# Patient Record
Sex: MALE | Race: WHITE
[De-identification: names, ages, dates, MRNs, and addresses within clinical notes are randomized per-mention and may not be internally consistent; named-entity substitution may affect disease eponyms.]

---

## 2019-01-17 ENCOUNTER — HOSPITAL ENCOUNTER (OUTPATIENT)
Dept: HOSPITAL 53 - M CLY | Age: 69
End: 2019-01-17
Attending: PHYSICIAN ASSISTANT
Payer: MEDICARE

## 2019-01-17 DIAGNOSIS — R06.02: Primary | ICD-10-CM

## 2019-01-21 NOTE — REP
Clinical:  Shortness of breath .

 

Comparison: None .

 

Technique:  PA and lateral.

 

Findings:

The mediastinum and cardiac silhouette are normal.  The lung fields are clear and

without acute consolidation, effusion, or pneumothorax.  The skeletal structures

are intact and normal.

 

Impression:

1.   No acute cardiopulmonary process.  If the patient remains symptomatic

consider chest CT for further evaluation.

## 2019-06-27 ENCOUNTER — HOSPITAL ENCOUNTER (OUTPATIENT)
Dept: HOSPITAL 53 - M CR | Age: 69
LOS: 3 days | End: 2019-06-30
Payer: MEDICARE

## 2019-06-27 DIAGNOSIS — Z95.1: Primary | ICD-10-CM

## 2019-06-27 NOTE — CARECAPL
Assessment Account #s:  Initial Assessment


General


Diagnoses:  CABG


Date of event:  Mar 1, 2019


Physician:  Oliver Thompson


Date Entered Program:  Jun 27, 2019


Risk strat for cardiac event:  Moderate





Exercise


Date:  Jun 27, 2019


Assessment:  Initial Assessment





Exercise Prescription


Plan


to educate about cardiac risk factors and to provide a monitored exercise 

program to build strength and endurance


Modalities initiated:  Treadmill (will add), Nustep (will add), Arm Aerometer 

(will add), Dumbells (will add), Recumbent Bike (will add)


Frequency:  3-4


Duration (Minutes)


30-60 minutes total exercise a day.


6-15 work intervals in minutes.


prn rest intervals in minutes.


Functional Capacity Goal


Sustained Metabolic Equivalent of a task (MET) goal of  for  minutes.


Intensity:  3-Moderate


Progression (METS)


Increase by: .5   METS every: 2-3 sessions


Angina with ex:  No


Target Heart Rate


rest + 35-40


Resistance Training:  Yes


Reps:  6-8


Hypertension:  Yes


Hypertension controlled with:  Diet


Resting


131/62


Meds


see below





Medications


Scheduled


Aspirin (Aspir 81), 81 MG PO DAILY, (Reported)


Lisinopril (Lisinopril), 1 TAB PO DAILY, (Reported)


Metoprolol Tartrate (Lopressor), 1 TAB PO BID, (Reported)





Target Goals


Individual exercise Rx (1)


/90 or 130/80 if DM or CKD (1)


Aerobic active 30+min 5 days per week (1)





Nutrition


Date:  Jun 27, 2019


Assessment:  Initial Assessment





Diabetes


Diabetes:  Yes


Diabetes medication


insulin


Monitor Blood Sugar at home:  Yes





Weight Management


Weight (lbs):  176.4


Height (inches):  64


BMI:  30.2


Special Diet:  low salt, mediteranean diet


Alcohol:  none


Diet Access Tool:  Rate your plate


Score:  58





Intervention


Diet Class:  Yes (will see this program)





Target goal


LDL-C<100 if triglycerides are >200


            Non-HDL-C should be <130 (1)


LDL-C<70 for high risk patients (4)


HbA1c<7% (1)


BMI<25 Waist cir<40in M/<35in F (1)





Education


Date:  Jun 27, 2019


Assessment:  Initial Assessment


Learning Barriers:  ready


Knowledge Test Score:  10


Family Support:  Yes


Tobacco use:  No


Quit:  >6 months (1980)





Intervention


Referral to smoking cessation:  No


Individual education and couns:  No


Tobacco Adjunct:  No


Education class schedule given:  Yes





Target Goals


Complete cessation of tobacco use (1).





Psychosocial


Date:  Jun 27, 2019


Assessment:  Initial Assessment





Psych Test (Initial/Discharge)


Tool Used:  CESD


Score:  4





Intervention


Physician Consult:  No


Physician Referral:  No


Stress Management Class:  Yes


Uses Stress Management Skills:  Yes





Target Goal


Assess presence or absence of depression using a valid screening tool (1).


Maximize coping skills (2).


Positive support system (2).





Patient/Program Goal


Preventative Medication:  Yes Aspirin, Yes Beta blockade, Yes Statin/OTR lipid 

Lowering


Fall Risk Assess:  No





Provider Assessment


Provider Assessment:  Proceed with rehab











Nata Lopez RN                  Jun 27, 2019 14:03

## 2019-07-22 NOTE — CARECAPL
Assessment Account #s:  Re-Assessment I


General


Diagnoses:  CABG


Date of event:  Mar 1, 2019


Physician:  Oliver Thompson


Date Entered Program:  Jun 27, 2019


Risk strat for cardiac event:  Moderate





Exercise


Date:  Jul 22, 2019


Assessment:  Re-Assessment I





Exercise Prescription


Plan


TO EDUCATE AND BUILD ENDURANCE THROUGH MONITORED EXERCISE


Modalities initiated:  Treadmill (METS=3.63/RPE=2), Nustep (METS=5.6/RPE=3), Arm

Aerometer (METS=3.6/RPE=3), Dumbells (5#/RPE=2), Recumbent Bike (METS=2.9/RPE=3)


Frequency:  3


Duration (Minutes)


30-60 minutes total exercise a day.


10-12 work intervals in minutes.


5 rest intervals in minutes.


Functional Capacity Goal


Sustained Metabolic Equivalent of a task (MET) goal of 3.75-4.75 for 15-20 

minutes.


Intensity:  3-Moderate


Progression (METS)


Increase by: 0.5   METS every: 5 sessions


Angina with ex:  No


Target Heart Rate


+35-40


Resistance Training:  Yes


Weight (pounds):  5


Reps:  12-15


Hypertension:  Yes


Hypertension controlled with:  Medication


Resting


98/60


Peak Exercise BP


148/80





Medications


Scheduled


Apixaban (Eliquis), 5 MG PO BID, (Reported)


Aspirin (Aspir 81), 81 MG PO DAILY, (Reported)


Atorvastatin Calcium (Atorvastatin Calcium), 10 MG PO DAILY, (Reported)


Cyanocobalamin (Vitamin B-12) (B-12), 1,000 MCG PO DAILY, (Reported)


Ergocalciferol (Vitamin D2) (Vitamin D2), 1 TAB PO DAILY, (Reported)


Fluticasone Propionate (Flonase Allergy Relief), 1 SPRAY NARES DAILY, (Reported)


Lisinopril (Lisinopril), 2.5 TAB PO DAILY, (Reported)


Metformin HCl (Metformin HCl), 1 GRAM PO BID, (Reported)


Metoprolol Tartrate (Lopressor), 25 TAB PO BID, (Reported)


Turmeric Root Extract (Turmeric), 1,053 MG PO DAILY, (Reported)





Scheduled PRN


Cyclobenzaprine HCl (Cyclobenzaprine HCl), 5 MG PO TIDP PRN for muscle spasms, 

(Reported)





Miscellaneous Medications


Insulin Glargine,Hum.rec.anlog (Basaglar Kwikpen U-100), 100 UNIT SC, (Reported)


Current BP


104/88


Med Change:  No





Intervention


Resistance Training:  Yes


Education:  Self pulse, Ex safety, S/S to report (INSTRUCTED TO NOTIFY STAFF OF 

CHEST PAIN/SOB), Low NA diet, BP medication, RPE Scale, Equipment orientation 

(ORIENTED TO EACH PIECE OF EQUIPMENT AS USED), warm up/cool down (EDUCATED ON 

IMPORTANCE OF WARM UPS PRIOR TO EXERCISE AND COOL DOWNS AFTER EXERCISE), 

Understand BP, Physical Active (INSTRUCTED ON IMPORTANCE OF CONTINUED EXERCISE 

AFTER FINISHING CARDIAC REHAB PROGRAM)





Target Goals


Individual exercise Rx (1)


/90 or 130/80 if DM or CKD (1)


Aerobic active 30+min 5 days per week (1)





Nutrition


Date:  Jul 22, 2019


Assessment:  Re-Assessment I


Med Change:  No





Diabetes


Diabetes:  No


Monitor Blood Sugar at home:  No


Medication Change:  No





Weight Management


Weight (lbs):  176


Special Diet:  low salt, mediteranean diet


Alcohol:  none


Current Weight (pounds):  176





Intervention


Dietician Consult:  Yes (WILL SEE DIETICIAN WHILE IN PROGRAM)


Nurse/patient discussion:  Yes


Dietary Goals


MAKE HEART HEALTHY CHOICES


Diet Class:  Yes


Referral to Diabetes education:  No


Referral to lipid clinic:  No


Referral to weight mangement p:  No





Education


Eating Healthy





Target goal


LDL-C<100 if triglycerides are >200


            Non-HDL-C should be <130 (1)


LDL-C<70 for high risk patients (4)


HbA1c<7% (1)


BMI<25 Waist cir<40in M/<35in F (1)





Education


Date:  Jul 22, 2019


Assessment:  Re-Assessment I


Learning Barriers:  ready


Family Support:  Yes


Tobacco use:  No





Tobacco Use


Smokeless tobacco:  No





Intervention


Referral to smoking cessation:  No


Individual education and couns:  No


Tobacco Adjunct:  No


Education class schedule given:  No


Attended education classes:  No


Education:  CAD, Risk factors, med compliance (EDUCATED ON IMPORTANCE OF TAKING 

MEDS AS PRESCRIBED), cardiac A&P, Angina S/S, Sexuality





Target Goals


Complete cessation of tobacco use (1).





Psychosocial


Date:  Jul 22, 2019


Assessment:  Re-Assessment I





Intervention


Physician Consult:  No


Physician Referral:  No


Med Change:  No


Stress Management Class:  No


Uses Stress Management Skills:  Yes





Education


Education:  Coping Techniques, S/S depression, Relaxation Techniques





Target Goal


Assess presence or absence of depression using a valid screening tool (1).


Maximize coping skills (2).


Positive support system (2).





Patient/Program Goal


Preventative Medication:  Yes Aspirin, Yes Beta blockade


Fall Risk Assess:  Yes (NOT A FALL RISK)





Provider Assessment


Session Number:  9


Provider Assessment:  Proceed with rehab











Grupo Akins RN          Jul 22, 2019 16:32

## 2019-07-26 ENCOUNTER — HOSPITAL ENCOUNTER (OUTPATIENT)
Dept: HOSPITAL 53 - M CR | Age: 69
LOS: 5 days | End: 2019-07-31
Payer: MEDICARE

## 2019-07-26 DIAGNOSIS — Z95.1: Primary | ICD-10-CM

## 2019-08-14 NOTE — CARECAPL
Assessment Account #s:  Re-Assessment II


General


Diagnoses:  CABG


Date of event:  Mar 1, 2019


Physician:  Oliver Thompson


Date Entered Program:  Jun 27, 2019


Risk strat for cardiac event:  Moderate





Exercise


Date:  Aug 14, 2019


Assessment:  Re-Assessment II





Exercise Prescription


Modalities initiated:  Treadmill (2.0/.5 mts 2.81 rpe 2   12 minutes), Nustep 

(L7 mts 5.7 rpe 3   15 minutes), Arm Aerometer (3.0 mts 3.8 rpe 3 10 minutes), 

Dumbells, Recumbent Bike (R5 mts 4.3 rpe 3 10 minutes)


Duration (Minutes)


 minutes total exercise a day.


 work intervals in minutes.


 rest intervals in minutes.


Functional Capacity Goal


Sustained Metabolic Equivalent of a task (MET) goal of  for  minutes.


Progression (METS)


Increase by:    METS every:  sessions


Resistance Training:  Yes


Weight (pounds):  6


Reps:  8-12





Medications


Scheduled


Apixaban (Eliquis), 5 MG PO BID, (Reported)


Aspirin (Aspir 81), 81 MG PO DAILY, (Reported)


Atorvastatin Calcium (Atorvastatin Calcium), 10 MG PO DAILY, (Reported)


Cyanocobalamin (Vitamin B-12) (B-12), 1,000 MCG PO DAILY, (Reported)


Ergocalciferol (Vitamin D2) (Vitamin D2), 1 TAB PO DAILY, (Reported)


Fluticasone Propionate (Flonase Allergy Relief), 1 SPRAY NARES DAILY, (Reported)


Liraglutide (Victoza 2-Karsten), 1.2 MG SC DAILY, (Reported)


Lisinopril (Lisinopril), 2.5 TAB PO DAILY, (Reported)


Metformin HCl (Metformin HCl), 1 GRAM PO BID, (Reported)


Metoprolol Tartrate (Lopressor), 25 TAB PO BID, (Reported)


Turmeric Root Extract (Turmeric), 1,053 MG PO DAILY, (Reported)





Scheduled PRN


Cyclobenzaprine HCl (Cyclobenzaprine HCl), 5 MG PO TIDP PRN for muscle spasms, 

(Reported)





Miscellaneous Medications


Insulin Glargine,Hum.rec.anlog (Basaglar Kwikpen U-100), 100 UNIT SC, (Reported)


Current BP


120/80


Med Change:  No





Intervention


Home exercise:  Type (home exercise program on last ITP)


Education Goals Met:  Yes (all education covered on last ITP)





Target Goals


Individual exercise Rx (1)


/90 or 130/80 if DM or CKD (1)


Aerobic active 30+min 5 days per week (1)





Nutrition


Date:  Aug 14, 2019


Assessment:  Re-Assessment II


Med Change:  No


Current Weight (pounds):  178





Intervention


Diet Class:  Yes (dietician see on 7/1/2019)


Education Goals Met:  Yes (all education covered on last ITP)





Target goal


LDL-C<100 if triglycerides are >200


            Non-HDL-C should be <130 (1)


LDL-C<70 for high risk patients (4)


HbA1c<7% (1)


BMI<25 Waist cir<40in M/<35in F (1)





Education


Date:  Aug 14, 2019


Assessment:  Re-Assessment II





Intervention


Attended education classes:  Yes


Education Goals Met:  Yes (all education covered on last ITP)





Target Goals


Complete cessation of tobacco use (1).





Psychosocial


Date:  Aug 14, 2019


Assessment:  Re-Assessment II


Med Change:  No


Education Goals Met:  Yes (all education covered on last ITP)





Target Goal


Assess presence or absence of depression using a valid screening tool (1).


Maximize coping skills (2).


Positive support system (2).





Provider Assessment


Session Number:  14


Provider Assessment:  No changes (good attendance.  Works hard, has excellent 

attitude)











Nata Lopez RN                  Aug 14, 2019 10:27

## 2019-08-30 ENCOUNTER — HOSPITAL ENCOUNTER (OUTPATIENT)
Dept: HOSPITAL 53 - M CR | Age: 69
LOS: 1 days | End: 2019-08-31
Attending: INTERNAL MEDICINE
Payer: MEDICARE

## 2019-08-30 DIAGNOSIS — Z95.1: Primary | ICD-10-CM

## 2019-09-04 NOTE — CARECAPL
Assessment Account #s:  Re-Assessment II (III)


General


Diagnoses:  CABG


Date of event:  Mar 1, 2019


Physician:  Oliver Thompson


Date Entered Program:  Jun 27, 2019


Risk strat for cardiac event:  Moderate





Exercise


Date:  Sep 4, 2019


Assessment:  Re-Assessment II (III)


Stages of change:  Pre-contemplation





Exercise Prescription


Modalities initiated:  Treadmill (2.0/5.0 mts 3.91 rpe 3), Nustep (L7  mts 3.8 

rpe 3 15 minutes), Arm Aerometer (3.5   mts 4.2 rpe 3 10 minutes), Dumbells, 

Recumbent Bike (R5 mts 4.9 rpe 3 10 minutes)


Duration (Minutes)


30 - 60 minutes total exercise a day.


15 - 20 work intervals in minutes.


PRN rest intervals in minutes.


Functional Capacity Goal


Sustained Metabolic Equivalent of a task (MET) goal of 5.0-6.0 for 15-20 

minutes.


Intensity:  3-Moderate


Progression (METS)


Increase by:    METS every:  sessions


Angina with ex:  No


Resistance Training:  Yes


Weight (pounds):  8


Reps:  8-12





Medications


Scheduled


Apixaban (Eliquis), 5 MG PO BID, (Reported)


Aspirin (Aspir 81), 81 MG PO DAILY, (Reported)


Atorvastatin Calcium (Atorvastatin Calcium), 10 MG PO DAILY, (Reported)


Cyanocobalamin (Vitamin B-12) (B-12), 1,000 MCG PO DAILY, (Reported)


Ergocalciferol (Vitamin D2) (Vitamin D2), 1 TAB PO DAILY, (Reported)


Fluticasone Propionate (Flonase Allergy Relief), 1 SPRAY NARES DAILY, (Reported)


Liraglutide (Victoza 2-Karsten), 1.2 MG SC DAILY, (Reported)


Lisinopril (Lisinopril), 2.5 TAB PO DAILY, (Reported)


Metformin HCl (Metformin HCl), 1 GRAM PO BID, (Reported)


Metoprolol Tartrate (Lopressor), 25 TAB PO BID, (Reported)


Turmeric Root Extract (Turmeric), 1,053 MG PO DAILY, (Reported)





Scheduled PRN


Cyclobenzaprine HCl (Cyclobenzaprine HCl), 5 MG PO TIDP PRN for muscle spasms, 

(Reported)





Miscellaneous Medications


Insulin Glargine,Hum.rec.anlog (Basaglar Kwikpen U-100), 100 UNIT SC, (Reported)


Current BP


118/78


Med Change:  No





Intervention


Home exercise:  Type (see prior ITP)


Education Goals Met:  Yes (all education done on prior ITP)





Target Goals


Individual exercise Rx (1)


/90 or 130/80 if DM or CKD (1)


Aerobic active 30+min 5 days per week (1)





Nutrition


Date:  Sep 4, 2019


Assessment:  Re-Assessment II (III)





Diabetes


Diabetes:  Yes


Medication Change:  No


Random Blood Sugar:  104


Blood sugar in range:  Yes


Current Weight (pounds):  178


Weight Goal


160





Intervention


Diet Class:  Yes (met with dietician on 7/1/2019)


Referral to Diabetes education:  No


Referral to lipid clinic:  No


Referral to weight mangement p:  No


Education Goals Met:  No (progressing toward goals.)





Target goal


LDL-C<100 if triglycerides are >200


            Non-HDL-C should be <130 (1)


LDL-C<70 for high risk patients (4)


HbA1c<7% (1)


BMI<25 Waist cir<40in M/<35in F (1)





Education


Date:  Sep 4, 2019


Assessment:  Re-Assessment II (III)


Stages of change:  Pre-contemplation


Education Goals Met:  No (progressing toward goals   all education covered on 

previous ITP)





Target Goals


Complete cessation of tobacco use (1).





Psychosocial


Date:  Sep 4, 2019


Assessment:  Re-Assessment II (III)


Stages of change:  Pre-contemplation


Stress Management Class:  Yes


Uses Stress Management Skills:  Yes


Education Goals Met:  No (all education covered on prior ITP)





Target Goal


Assess presence or absence of depression using a valid screening tool (1).


Maximize coping skills (2).


Positive support system (2).





Provider Assessment


Session Number:  19


Provider Assessment:  No changes (excellent attendance, work ethic and very 

receptive to continued education)











Nata Lopez RN                   Sep 4, 2019 08:54

## 2019-09-06 ENCOUNTER — HOSPITAL ENCOUNTER (OUTPATIENT)
Dept: HOSPITAL 53 - M CR | Age: 69
LOS: 24 days | End: 2019-09-30
Attending: INTERNAL MEDICINE
Payer: MEDICARE

## 2019-09-06 DIAGNOSIS — Z95.1: Primary | ICD-10-CM

## 2019-09-18 NOTE — CARECAPL
Assessment Account #s:  Discharge


General


Diagnoses:  CABG


Date of event:  Mar 1, 2019


Physician:  Oliver Thompson


Date Entered Program:  Jun 27, 2019


Risk strat for cardiac event:  Moderate





Exercise


Assessment:  Followup/Discharge


Stages of change:  maintenance





Exercise Prescription


Modalities initiated:  Treadmill (METS=3.36/RPE=3), Nustep (METS=4.6/RPE=3), Arm

Aerometer (METS=4.1/RPE=3), Dumbells (8#/RPE=3), Recumbent Bike (METS=4.9/RPE=3)


Frequency:  3


Duration (Minutes)


30 - 60 minutes total exercise a day.


15 - 20 work intervals in minutes.


PRN rest intervals in minutes.


Functional Capacity Goal


Sustained Metabolic Equivalent of a task (MET) goal of 5.0-6.0 for 15-20  

minutes.


Intensity:  3-Moderate


Progression (METS)


Increase by:    METS every:  sessions


Angina with ex:  No


Resistance Training:  Yes


Weight (pounds):  8


Reps:  12-15


Hypertension:  Yes


Hypertension controlled with:  Medication (METOPROLOL/LISINOPRIL)


Resting


130/80


Peak Exercise BP


148/84





Medications


Scheduled


Apixaban (Eliquis), 5 MG PO BID, (Reported)


Aspirin (Aspir 81), 81 MG PO DAILY, (Reported)


Atorvastatin Calcium (Atorvastatin Calcium), 10 MG PO DAILY, (Reported)


Cyanocobalamin (Vitamin B-12) (B-12), 1,000 MCG PO DAILY, (Reported)


Ergocalciferol (Vitamin D2) (Vitamin D2), 1 TAB PO DAILY, (Reported)


Fluticasone Propionate (Flonase Allergy Relief), 1 SPRAY NARES DAILY, (Reported)


Liraglutide (Victoza 2-Karsten), 1.2 MG SC DAILY, (Reported)


Lisinopril (Lisinopril), 2.5 TAB PO DAILY, (Reported)


Metformin HCl (Metformin HCl), 1 GRAM PO BID, (Reported)


Metoprolol Tartrate (Lopressor), 25 TAB PO BID, (Reported)


Turmeric Root Extract (Turmeric), 1,053 MG PO DAILY, (Reported)





Scheduled PRN


Cyclobenzaprine HCl (Cyclobenzaprine HCl), 5 MG PO TIDP PRN for muscle spasms, 

(Reported)





Miscellaneous Medications


Insulin Glargine,Hum.rec.anlog (Basaglar Kwikpen U-100), 100 UNIT SC, (Reported)


Current BP


130/80


Med Change:  No





Intervention


Resistance Training:  Yes


Education:  Self pulse (EDUCATION DONE ON PRIOR ITP)


Education Goals Met:  Yes





Target Goals


Individual exercise Rx (1)


/90 or 130/80 if DM or CKD (1)


Aerobic active 30+min 5 days per week (1)





Nutrition


Date:  Sep 18, 2019


Assessment:  Followup/Discharge


Stages of change:  maintenance


Lipid- med/supplement


ATORVASTATIN 10 MG DAILY


Med Change:  No





Diabetes


Diabetes:  Yes


Diabetes medication


METFORMIN,


Monitor Blood Sugar at home:  Yes


Medication Change:  No


Blood sugar in range:  Yes





Weight Management


Weight (lbs):  178


Weight goal:  160


Special Diet:  low salt, low-fat


Current Weight (pounds):  178


Weight Goal


160





Intervention


Dietician Consult:  No


Nurse/patient discussion:  Yes


Dietary Goals


MAKE HEART HEALTHY CHOICES


Diet Class:  Yes (MET WITH DIETICIAN 7/1/2019)


Referral to Diabetes education:  No


Referral to lipid clinic:  No


Referral to weight mangement p:  No





Education


S&S hypo/hyper glycemia, Relate Diabetes in CAD, Eating Healthy


Education Goals Met:  Yes





Target goal


LDL-C<100 if triglycerides are >200


            Non-HDL-C should be <130 (1)


LDL-C<70 for high risk patients (4)


HbA1c<7% (1)


BMI<25 Waist cir<40in M/<35in F (1)





Education


Date:  Sep 18, 2019


Assessment:  Followup/Discharge


Learning Barriers:  ready


Stages of change:  maintenance


Family Support:  Yes


Tobacco use:  No





Tobacco Use


Smokeless tobacco:  No





Intervention


Referral to smoking cessation:  No


Individual education and couns:  No


Tobacco Adjunct:  No


Education class schedule given:  No


Attended education classes:  No


Education:  CAD, Risk factors, med compliance, cardiac A&P, Angina S/S, 

Sexuality


Education Goals Met:  Yes





Target Goals


Complete cessation of tobacco use (1).





Psychosocial


Date:  Sep 18, 2019


Assessment:  Followup/Discharge


Stages of change:  maintenance





Intervention


Physician Consult:  No


Physician Referral:  No


Med Change:  No


Stress Management Class:  No


Uses Stress Management Skills:  Yes





Education


Education:  Coping Techniques (ALL EDUCATION COVERED ON PRIOR ITP), S/S 

depression, Relaxation Techniques


Education Goals Met:  Yes





Target Goal


Assess presence or absence of depression using a valid screening tool (1).


Maximize coping skills (2).


Positive support system (2).


Fall Risk Assess:  Yes (NOT )





Provider Assessment


Session Number:  21


Provider Assessment:  Proceed with rehab











Grupo Akins RN          Sep 18, 2019 16:55

## 2023-05-16 ENCOUNTER — HOSPITAL ENCOUNTER (INPATIENT)
Dept: HOSPITAL 53 - M ED | Age: 73
LOS: 4 days | Discharge: HOME HEALTH | DRG: 603 | End: 2023-05-20
Attending: INTERNAL MEDICINE | Admitting: INTERNAL MEDICINE
Payer: MEDICARE

## 2023-05-16 VITALS — WEIGHT: 178.38 LBS | HEIGHT: 64 IN | BODY MASS INDEX: 30.45 KG/M2

## 2023-05-16 VITALS — SYSTOLIC BLOOD PRESSURE: 133 MMHG | DIASTOLIC BLOOD PRESSURE: 73 MMHG

## 2023-05-16 DIAGNOSIS — E53.8: ICD-10-CM

## 2023-05-16 DIAGNOSIS — E55.9: ICD-10-CM

## 2023-05-16 DIAGNOSIS — Z79.899: ICD-10-CM

## 2023-05-16 DIAGNOSIS — M54.9: ICD-10-CM

## 2023-05-16 DIAGNOSIS — Z95.1: ICD-10-CM

## 2023-05-16 DIAGNOSIS — E78.5: ICD-10-CM

## 2023-05-16 DIAGNOSIS — E11.9: ICD-10-CM

## 2023-05-16 DIAGNOSIS — Z79.82: ICD-10-CM

## 2023-05-16 DIAGNOSIS — G89.29: ICD-10-CM

## 2023-05-16 DIAGNOSIS — Z79.84: ICD-10-CM

## 2023-05-16 DIAGNOSIS — Z87.891: ICD-10-CM

## 2023-05-16 DIAGNOSIS — I25.10: ICD-10-CM

## 2023-05-16 DIAGNOSIS — L03.114: Primary | ICD-10-CM

## 2023-05-16 LAB
ALBUMIN SERPL BCG-MCNC: 3.7 G/DL (ref 3.2–5.2)
ALP SERPL-CCNC: 96 U/L (ref 46–116)
ALT SERPL W P-5'-P-CCNC: < 9 U/L (ref 7–40)
APTT BLD: 29.5 SECONDS (ref 24.8–34.2)
AST SERPL-CCNC: 15 U/L (ref ?–34)
BASOPHILS # BLD AUTO: 0 10^3/UL (ref 0–0.2)
BASOPHILS NFR BLD AUTO: 0.3 % (ref 0–1)
BILIRUB CONJ SERPL-MCNC: 0.3 MG/DL (ref ?–0.4)
BILIRUB SERPL-MCNC: 1.1 MG/DL (ref 0.3–1.2)
BUN SERPL-MCNC: 23 MG/DL (ref 9–23)
CALCIUM SERPL-MCNC: 9.4 MG/DL (ref 8.3–10.6)
CHLORIDE SERPL-SCNC: 100 MMOL/L (ref 98–107)
CO2 SERPL-SCNC: 26 MMOL/L (ref 20–31)
CREAT SERPL-MCNC: 0.81 MG/DL (ref 0.7–1.3)
CRP SERPL-MCNC: 11 MG/DL (ref ?–1)
EOSINOPHIL # BLD AUTO: 0 10^3/UL (ref 0–0.5)
EOSINOPHIL NFR BLD AUTO: 0.2 % (ref 0–3)
ERYTHROCYTE [SEDIMENTATION RATE] IN BLOOD BY WESTERGREN METHOD: 89 MM/HR (ref 0–20)
EST. AVERAGE GLUCOSE BLD GHB EST-MCNC: 146 MG/DL (ref 60–110)
GFR SERPL CREATININE-BSD FRML MDRD: > 60 ML/MIN/{1.73_M2} (ref 42–?)
GLUCOSE SERPL-MCNC: 135 MG/DL (ref 74–106)
HCT VFR BLD AUTO: 42.3 % (ref 42–52)
HGB BLD-MCNC: 13.6 G/DL (ref 13.5–17.5)
INR PPP: 1.05
LYMPHOCYTES # BLD AUTO: 1.5 10^3/UL (ref 1.5–5)
LYMPHOCYTES NFR BLD AUTO: 12.9 % (ref 24–44)
MCH RBC QN AUTO: 28.3 PG (ref 27–33)
MCHC RBC AUTO-ENTMCNC: 32.2 G/DL (ref 32–36.5)
MCV RBC AUTO: 88.1 FL (ref 80–96)
MONOCYTES # BLD AUTO: 1.4 10^3/UL (ref 0–0.8)
MONOCYTES NFR BLD AUTO: 12 % (ref 2–8)
NEUTROPHILS # BLD AUTO: 8.8 10^3/UL (ref 1.5–8.5)
NEUTROPHILS NFR BLD AUTO: 74.4 % (ref 36–66)
PLATELET # BLD AUTO: 201 10^3/UL (ref 150–450)
POTASSIUM SERPL-SCNC: 3.9 MMOL/L (ref 3.5–5.1)
PROT SERPL-MCNC: 7.8 G/DL (ref 5.7–8.2)
PROTHROMBIN TIME: 13.9 SECONDS (ref 12.5–14.5)
RBC # BLD AUTO: 4.8 10^6/UL (ref 4.3–6.1)
RHEUMATOID FACT SERPL-ACNC: 9.5 IU/ML (ref ?–14)
SODIUM SERPL-SCNC: 136 MMOL/L (ref 136–145)
URATE SERPL-MCNC: 4.3 MG/DL (ref 3.7–9.2)
WBC # BLD AUTO: 11.8 10^3/UL (ref 4–10)

## 2023-05-16 RX ADMIN — INSULIN LISPRO SCH UNITS: 100 INJECTION, SOLUTION INTRAVENOUS; SUBCUTANEOUS at 17:30

## 2023-05-16 RX ADMIN — INSULIN LISPRO SCH UNITS: 100 INJECTION, SOLUTION INTRAVENOUS; SUBCUTANEOUS at 20:43

## 2023-05-16 RX ADMIN — PROBIOTIC PRODUCT - TAB SCH EA: TAB at 20:45

## 2023-05-17 VITALS — SYSTOLIC BLOOD PRESSURE: 118 MMHG | DIASTOLIC BLOOD PRESSURE: 68 MMHG

## 2023-05-17 VITALS — SYSTOLIC BLOOD PRESSURE: 125 MMHG | DIASTOLIC BLOOD PRESSURE: 76 MMHG

## 2023-05-17 VITALS — DIASTOLIC BLOOD PRESSURE: 66 MMHG | SYSTOLIC BLOOD PRESSURE: 118 MMHG

## 2023-05-17 LAB
ALBUMIN SERPL BCG-MCNC: 3.3 G/DL (ref 3.2–5.2)
ALP SERPL-CCNC: 86 U/L (ref 46–116)
ALT SERPL W P-5'-P-CCNC: < 9 U/L (ref 7–40)
AST SERPL-CCNC: 12 U/L (ref ?–34)
BILIRUB SERPL-MCNC: 1 MG/DL (ref 0.3–1.2)
BUN SERPL-MCNC: 21 MG/DL (ref 9–23)
CALCIUM SERPL-MCNC: 8.8 MG/DL (ref 8.3–10.6)
CHLORIDE SERPL-SCNC: 100 MMOL/L (ref 98–107)
CO2 SERPL-SCNC: 24 MMOL/L (ref 20–31)
CREAT SERPL-MCNC: 0.74 MG/DL (ref 0.7–1.3)
CRP SERPL-MCNC: 10.4 MG/DL (ref ?–1)
ERYTHROCYTE [SEDIMENTATION RATE] IN BLOOD BY WESTERGREN METHOD: 126 MM/HR (ref 0–20)
GFR SERPL CREATININE-BSD FRML MDRD: > 60 ML/MIN/{1.73_M2} (ref 42–?)
GLUCOSE SERPL-MCNC: 148 MG/DL (ref 74–106)
HCT VFR BLD AUTO: 42 % (ref 42–52)
HGB BLD-MCNC: 13.1 G/DL (ref 13.5–17.5)
MCH RBC QN AUTO: 27.4 PG (ref 27–33)
MCHC RBC AUTO-ENTMCNC: 31.2 G/DL (ref 32–36.5)
MCV RBC AUTO: 87.9 FL (ref 80–96)
PLATELET # BLD AUTO: 181 10^3/UL (ref 150–450)
POTASSIUM SERPL-SCNC: 4.1 MMOL/L (ref 3.5–5.1)
PROT SERPL-MCNC: 7 G/DL (ref 5.7–8.2)
RBC # BLD AUTO: 4.78 10^6/UL (ref 4.3–6.1)
SODIUM SERPL-SCNC: 135 MMOL/L (ref 136–145)
WBC # BLD AUTO: 12 10^3/UL (ref 4–10)

## 2023-05-17 RX ADMIN — ACETAMINOPHEN PRN MG: 325 TABLET ORAL at 21:02

## 2023-05-17 RX ADMIN — PROBIOTIC PRODUCT - TAB SCH EA: TAB at 09:53

## 2023-05-17 RX ADMIN — LISINOPRIL SCH MG: 2.5 TABLET ORAL at 09:52

## 2023-05-17 RX ADMIN — PROBIOTIC PRODUCT - TAB SCH EA: TAB at 17:36

## 2023-05-17 RX ADMIN — DEXTROSE MONOHYDRATE SCH MLS/HR: 5 INJECTION INTRAVENOUS at 18:52

## 2023-05-17 RX ADMIN — METOPROLOL SUCCINATE SCH MG: 25 TABLET, EXTENDED RELEASE ORAL at 09:40

## 2023-05-17 RX ADMIN — INSULIN LISPRO SCH UNITS: 100 INJECTION, SOLUTION INTRAVENOUS; SUBCUTANEOUS at 12:39

## 2023-05-17 RX ADMIN — INSULIN LISPRO SCH UNITS: 100 INJECTION, SOLUTION INTRAVENOUS; SUBCUTANEOUS at 17:52

## 2023-05-17 RX ADMIN — DEXTROSE MONOHYDRATE SCH MLS/HR: 5 INJECTION INTRAVENOUS at 01:24

## 2023-05-17 RX ADMIN — ROSUVASTATIN CALCIUM SCH MG: 10 TABLET, FILM COATED ORAL at 09:53

## 2023-05-17 RX ADMIN — INSULIN LISPRO SCH UNITS: 100 INJECTION, SOLUTION INTRAVENOUS; SUBCUTANEOUS at 09:35

## 2023-05-17 RX ADMIN — INSULIN LISPRO SCH UNITS: 100 INJECTION, SOLUTION INTRAVENOUS; SUBCUTANEOUS at 20:53

## 2023-05-17 RX ADMIN — DEXTROSE MONOHYDRATE SCH MLS/HR: 5 INJECTION INTRAVENOUS at 12:39

## 2023-05-17 RX ADMIN — DEXTROSE MONOHYDRATE SCH MLS/HR: 50 INJECTION, SOLUTION INTRAVENOUS at 16:12

## 2023-05-17 RX ADMIN — ASPIRIN 81 MG CHEWABLE TABLET SCH MG: 81 TABLET CHEWABLE at 09:53

## 2023-05-17 RX ADMIN — DEXTROSE MONOHYDRATE SCH MLS/HR: 5 INJECTION INTRAVENOUS at 06:16

## 2023-05-17 RX ADMIN — ACETAMINOPHEN PRN MG: 325 TABLET ORAL at 05:43

## 2023-05-17 RX ADMIN — ENOXAPARIN SODIUM SCH MG: 40 INJECTION SUBCUTANEOUS at 09:41

## 2023-05-17 RX ADMIN — FLUTICASONE PROPIONATE SCH SPRAY: 50 SPRAY, METERED NASAL at 09:35

## 2023-05-17 RX ADMIN — SODIUM CHLORIDE SCH MLS/HR: 9 INJECTION, SOLUTION INTRAVENOUS at 17:36

## 2023-05-18 VITALS — SYSTOLIC BLOOD PRESSURE: 111 MMHG | DIASTOLIC BLOOD PRESSURE: 67 MMHG

## 2023-05-18 VITALS — SYSTOLIC BLOOD PRESSURE: 114 MMHG | DIASTOLIC BLOOD PRESSURE: 67 MMHG

## 2023-05-18 VITALS — SYSTOLIC BLOOD PRESSURE: 123 MMHG | DIASTOLIC BLOOD PRESSURE: 68 MMHG

## 2023-05-18 LAB
ALBUMIN SERPL BCG-MCNC: 3.1 G/DL (ref 3.2–5.2)
ALP SERPL-CCNC: 80 U/L (ref 46–116)
ALT SERPL W P-5'-P-CCNC: < 9 U/L (ref 7–40)
AST SERPL-CCNC: 18 U/L (ref ?–34)
BILIRUB SERPL-MCNC: 0.8 MG/DL (ref 0.3–1.2)
BUN SERPL-MCNC: 18 MG/DL (ref 9–23)
CALCIUM SERPL-MCNC: 8.9 MG/DL (ref 8.3–10.6)
CHLORIDE SERPL-SCNC: 101 MMOL/L (ref 98–107)
CO2 SERPL-SCNC: 28 MMOL/L (ref 20–31)
CREAT SERPL-MCNC: 0.64 MG/DL (ref 0.7–1.3)
GFR SERPL CREATININE-BSD FRML MDRD: > 60 ML/MIN/{1.73_M2} (ref 42–?)
GLUCOSE SERPL-MCNC: 175 MG/DL (ref 74–106)
HCT VFR BLD AUTO: 39.6 % (ref 42–52)
HGB BLD-MCNC: 12.5 G/DL (ref 13.5–17.5)
MCH RBC QN AUTO: 27.3 PG (ref 27–33)
MCHC RBC AUTO-ENTMCNC: 31.6 G/DL (ref 32–36.5)
MCV RBC AUTO: 86.5 FL (ref 80–96)
PLATELET # BLD AUTO: 191 10^3/UL (ref 150–450)
POTASSIUM SERPL-SCNC: 4 MMOL/L (ref 3.5–5.1)
PROT SERPL-MCNC: 6.8 G/DL (ref 5.7–8.2)
RBC # BLD AUTO: 4.58 10^6/UL (ref 4.3–6.1)
SODIUM SERPL-SCNC: 134 MMOL/L (ref 136–145)
WBC # BLD AUTO: 7.6 10^3/UL (ref 4–10)

## 2023-05-18 RX ADMIN — SODIUM CHLORIDE SCH MLS/HR: 9 INJECTION, SOLUTION INTRAVENOUS at 06:19

## 2023-05-18 RX ADMIN — ASPIRIN 81 MG CHEWABLE TABLET SCH MG: 81 TABLET CHEWABLE at 07:56

## 2023-05-18 RX ADMIN — FLUTICASONE PROPIONATE SCH SPRAY: 50 SPRAY, METERED NASAL at 08:01

## 2023-05-18 RX ADMIN — INSULIN LISPRO SCH UNITS: 100 INJECTION, SOLUTION INTRAVENOUS; SUBCUTANEOUS at 12:00

## 2023-05-18 RX ADMIN — ENOXAPARIN SODIUM SCH MG: 40 INJECTION SUBCUTANEOUS at 08:00

## 2023-05-18 RX ADMIN — METOPROLOL SUCCINATE SCH MG: 25 TABLET, EXTENDED RELEASE ORAL at 07:58

## 2023-05-18 RX ADMIN — INSULIN LISPRO SCH UNITS: 100 INJECTION, SOLUTION INTRAVENOUS; SUBCUTANEOUS at 20:27

## 2023-05-18 RX ADMIN — DEXTROSE MONOHYDRATE SCH MLS/HR: 5 INJECTION INTRAVENOUS at 01:32

## 2023-05-18 RX ADMIN — DEXTROSE MONOHYDRATE SCH MLS/HR: 5 INJECTION INTRAVENOUS at 17:54

## 2023-05-18 RX ADMIN — DEXTROSE MONOHYDRATE SCH MLS/HR: 50 INJECTION, SOLUTION INTRAVENOUS at 04:48

## 2023-05-18 RX ADMIN — LISINOPRIL SCH MG: 2.5 TABLET ORAL at 07:56

## 2023-05-18 RX ADMIN — DEXTROSE MONOHYDRATE SCH MLS/HR: 5 INJECTION INTRAVENOUS at 07:55

## 2023-05-18 RX ADMIN — ROSUVASTATIN CALCIUM SCH MG: 10 TABLET, FILM COATED ORAL at 07:55

## 2023-05-18 RX ADMIN — PROBIOTIC PRODUCT - TAB SCH EA: TAB at 07:55

## 2023-05-18 RX ADMIN — DEXTROSE MONOHYDRATE SCH MLS/HR: 5 INJECTION INTRAVENOUS at 13:02

## 2023-05-18 RX ADMIN — PROBIOTIC PRODUCT - TAB SCH EA: TAB at 17:52

## 2023-05-18 RX ADMIN — INSULIN LISPRO SCH UNITS: 100 INJECTION, SOLUTION INTRAVENOUS; SUBCUTANEOUS at 17:53

## 2023-05-18 RX ADMIN — INSULIN LISPRO SCH UNITS: 100 INJECTION, SOLUTION INTRAVENOUS; SUBCUTANEOUS at 07:58

## 2023-05-19 VITALS — SYSTOLIC BLOOD PRESSURE: 118 MMHG | DIASTOLIC BLOOD PRESSURE: 68 MMHG

## 2023-05-19 VITALS — SYSTOLIC BLOOD PRESSURE: 119 MMHG | DIASTOLIC BLOOD PRESSURE: 70 MMHG

## 2023-05-19 VITALS — DIASTOLIC BLOOD PRESSURE: 69 MMHG | SYSTOLIC BLOOD PRESSURE: 119 MMHG

## 2023-05-19 LAB
ALBUMIN SERPL BCG-MCNC: 3.1 G/DL (ref 3.2–5.2)
ALP SERPL-CCNC: 76 U/L (ref 46–116)
ALT SERPL W P-5'-P-CCNC: 10 U/L (ref 7–40)
AST SERPL-CCNC: 22 U/L (ref ?–34)
BILIRUB SERPL-MCNC: 0.5 MG/DL (ref 0.3–1.2)
BUN SERPL-MCNC: 19 MG/DL (ref 9–23)
CALCIUM SERPL-MCNC: 9.1 MG/DL (ref 8.3–10.6)
CHLORIDE SERPL-SCNC: 102 MMOL/L (ref 98–107)
CO2 SERPL-SCNC: 29 MMOL/L (ref 20–31)
CREAT SERPL-MCNC: 0.65 MG/DL (ref 0.7–1.3)
CRP SERPL-MCNC: 5.7 MG/DL (ref ?–1)
ERYTHROCYTE [SEDIMENTATION RATE] IN BLOOD BY WESTERGREN METHOD: 93 MM/HR (ref 0–20)
GFR SERPL CREATININE-BSD FRML MDRD: > 60 ML/MIN/{1.73_M2} (ref 42–?)
GLUCOSE SERPL-MCNC: 163 MG/DL (ref 74–106)
HCT VFR BLD AUTO: 40.1 % (ref 42–52)
HGB BLD-MCNC: 12.6 G/DL (ref 13.5–17.5)
MCH RBC QN AUTO: 27.5 PG (ref 27–33)
MCHC RBC AUTO-ENTMCNC: 31.4 G/DL (ref 32–36.5)
MCV RBC AUTO: 87.6 FL (ref 80–96)
PLATELET # BLD AUTO: 193 10^3/UL (ref 150–450)
POTASSIUM SERPL-SCNC: 4.3 MMOL/L (ref 3.5–5.1)
PROT SERPL-MCNC: 6.9 G/DL (ref 5.7–8.2)
RBC # BLD AUTO: 4.58 10^6/UL (ref 4.3–6.1)
SODIUM SERPL-SCNC: 136 MMOL/L (ref 136–145)
WBC # BLD AUTO: 6.5 10^3/UL (ref 4–10)

## 2023-05-19 RX ADMIN — PROBIOTIC PRODUCT - TAB SCH EA: TAB at 08:56

## 2023-05-19 RX ADMIN — METOPROLOL SUCCINATE SCH MG: 25 TABLET, EXTENDED RELEASE ORAL at 08:56

## 2023-05-19 RX ADMIN — PROBIOTIC PRODUCT - TAB SCH EA: TAB at 17:26

## 2023-05-19 RX ADMIN — ROSUVASTATIN CALCIUM SCH MG: 10 TABLET, FILM COATED ORAL at 08:55

## 2023-05-19 RX ADMIN — INSULIN LISPRO SCH UNITS: 100 INJECTION, SOLUTION INTRAVENOUS; SUBCUTANEOUS at 20:49

## 2023-05-19 RX ADMIN — DEXTROSE MONOHYDRATE SCH MLS/HR: 5 INJECTION INTRAVENOUS at 06:43

## 2023-05-19 RX ADMIN — ENOXAPARIN SODIUM SCH MG: 40 INJECTION SUBCUTANEOUS at 08:55

## 2023-05-19 RX ADMIN — DEXTROSE MONOHYDRATE SCH MLS/HR: 5 INJECTION INTRAVENOUS at 13:29

## 2023-05-19 RX ADMIN — INSULIN LISPRO SCH UNITS: 100 INJECTION, SOLUTION INTRAVENOUS; SUBCUTANEOUS at 13:30

## 2023-05-19 RX ADMIN — FLUTICASONE PROPIONATE SCH SPRAY: 50 SPRAY, METERED NASAL at 08:58

## 2023-05-19 RX ADMIN — INSULIN LISPRO SCH UNITS: 100 INJECTION, SOLUTION INTRAVENOUS; SUBCUTANEOUS at 17:27

## 2023-05-19 RX ADMIN — ASPIRIN 81 MG CHEWABLE TABLET SCH MG: 81 TABLET CHEWABLE at 08:56

## 2023-05-19 RX ADMIN — DEXTROSE MONOHYDRATE SCH MLS/HR: 5 INJECTION INTRAVENOUS at 18:19

## 2023-05-19 RX ADMIN — DEXTROSE MONOHYDRATE SCH MLS/HR: 5 INJECTION INTRAVENOUS at 01:08

## 2023-05-19 RX ADMIN — LISINOPRIL SCH MG: 2.5 TABLET ORAL at 08:55

## 2023-05-19 RX ADMIN — INSULIN LISPRO SCH UNITS: 100 INJECTION, SOLUTION INTRAVENOUS; SUBCUTANEOUS at 08:57

## 2023-05-20 VITALS — DIASTOLIC BLOOD PRESSURE: 70 MMHG | SYSTOLIC BLOOD PRESSURE: 118 MMHG

## 2023-05-20 VITALS — SYSTOLIC BLOOD PRESSURE: 118 MMHG | DIASTOLIC BLOOD PRESSURE: 70 MMHG

## 2023-05-20 LAB
ALBUMIN SERPL BCG-MCNC: 3.2 G/DL (ref 3.2–5.2)
ALP SERPL-CCNC: 75 U/L (ref 46–116)
ALT SERPL W P-5'-P-CCNC: 10 U/L (ref 7–40)
AST SERPL-CCNC: 21 U/L (ref ?–34)
BILIRUB SERPL-MCNC: 0.5 MG/DL (ref 0.3–1.2)
BUN SERPL-MCNC: 17 MG/DL (ref 9–23)
CALCIUM SERPL-MCNC: 9.2 MG/DL (ref 8.3–10.6)
CHLORIDE SERPL-SCNC: 103 MMOL/L (ref 98–107)
CO2 SERPL-SCNC: 27 MMOL/L (ref 20–31)
CREAT SERPL-MCNC: 0.7 MG/DL (ref 0.7–1.3)
GFR SERPL CREATININE-BSD FRML MDRD: > 60 ML/MIN/{1.73_M2} (ref 42–?)
GLUCOSE SERPL-MCNC: 167 MG/DL (ref 74–106)
HCT VFR BLD AUTO: 40.5 % (ref 42–52)
HGB BLD-MCNC: 12.6 G/DL (ref 13.5–17.5)
MCH RBC QN AUTO: 27.6 PG (ref 27–33)
MCHC RBC AUTO-ENTMCNC: 31.1 G/DL (ref 32–36.5)
MCV RBC AUTO: 88.6 FL (ref 80–96)
PLATELET # BLD AUTO: 207 10^3/UL (ref 150–450)
POTASSIUM SERPL-SCNC: 4.4 MMOL/L (ref 3.5–5.1)
PROT SERPL-MCNC: 6.9 G/DL (ref 5.7–8.2)
RBC # BLD AUTO: 4.57 10^6/UL (ref 4.3–6.1)
SODIUM SERPL-SCNC: 136 MMOL/L (ref 136–145)
WBC # BLD AUTO: 7.1 10^3/UL (ref 4–10)

## 2023-05-20 RX ADMIN — LISINOPRIL SCH MG: 2.5 TABLET ORAL at 09:43

## 2023-05-20 RX ADMIN — METOPROLOL SUCCINATE SCH MG: 25 TABLET, EXTENDED RELEASE ORAL at 09:44

## 2023-05-20 RX ADMIN — PROBIOTIC PRODUCT - TAB SCH EA: TAB at 09:43

## 2023-05-20 RX ADMIN — INSULIN LISPRO SCH UNITS: 100 INJECTION, SOLUTION INTRAVENOUS; SUBCUTANEOUS at 09:41

## 2023-05-20 RX ADMIN — FLUTICASONE PROPIONATE SCH SPRAY: 50 SPRAY, METERED NASAL at 09:45

## 2023-05-20 RX ADMIN — ENOXAPARIN SODIUM SCH MG: 40 INJECTION SUBCUTANEOUS at 09:42

## 2023-05-20 RX ADMIN — ROSUVASTATIN CALCIUM SCH MG: 10 TABLET, FILM COATED ORAL at 09:43

## 2023-05-20 RX ADMIN — ASPIRIN 81 MG CHEWABLE TABLET SCH MG: 81 TABLET CHEWABLE at 09:44

## 2023-05-20 RX ADMIN — DEXTROSE MONOHYDRATE SCH MLS/HR: 5 INJECTION INTRAVENOUS at 06:33

## 2023-05-20 RX ADMIN — DEXTROSE MONOHYDRATE SCH MLS/HR: 5 INJECTION INTRAVENOUS at 01:02
